# Patient Record
Sex: MALE | Race: WHITE
[De-identification: names, ages, dates, MRNs, and addresses within clinical notes are randomized per-mention and may not be internally consistent; named-entity substitution may affect disease eponyms.]

---

## 2017-06-20 NOTE — NUR
PT BIB PARAMEDICS, PT SENT FOR EVAL DUE TO C/O CP X 4 HRS, PT IS ALERT, 
ORIENTED X 3, NO RESP DISTRESS NOTED OR REPORTED UPON ASSESSMENT... MD AT 
BEDSIDE...

## 2017-06-21 NOTE — NUR
PATIENT SLEPT DURING THE REMAINDER OF MY SHIFT. NO CHANGES NOTED. TELE SR, SB AT THE UPPER 
50's.  SKIN INTACT. ADMISSION PROTOCOL FOLLOWED. ALL SAFETY AND COMFORT MEASURES MAINTAINED 
T/O SHIFT. CALL LIGHT WITHIN REACH.

## 2017-06-21 NOTE — NUR
Pt. admitted to Telemetry rm 212  , under care of Dr. Vogel, Belongs List 
completed, pt alert, oriented x 3, no resp distress noted or reported upon 
transfer assessment...pt transferred via gurney...

## 2017-06-21 NOTE — NUR
RECEIVED PT. IN BED WITH EYES CLOSED, RESTING. NO S/S OF DISTRESS. PT ON CARDIAC MONITOR 
WITH SINUS BIJU RHYTHM.

## 2017-06-21 NOTE — NUR
RESTING IN BED COMFORTABLY, WATCHING TV. ALERT, ORIENTED. ABLE TO MAKE NEEDS KNOWN. NO ACUTE 
DISTRESS NOTED. CALL LIGHT WITHIN REACH. WILL CONTINUE TO MONITOR

## 2017-06-22 NOTE — NUR
PT SEEN AND EVALUATED BY PHYSICAL THERAPY, UNABLE TO WALK FOR LONG DISTANCE DUE TO LOW BLOOD 
PRESSURE. PLEASE SEE PT NOTES.

## 2017-06-22 NOTE — NUR
RECEIVED PATIENT AWAKE IN BED, WATCHING TV. PATIENT IS A/O X4. PLEASANT WHEN APPROACHED. 
DENIES CHEST PAIN OR ANY OTHER DISCOMFORT AT THIS TIME. NO RESP. DISTRESS NOTED. VSS. CALL 
LIGHT IN REACH. BED ALARM ON. WILL CONTINUE TO MONITOR. ALL NEEDS ATTENDED.

## 2017-06-22 NOTE — NUR
PATIENT ASLEEP IN BED. NO RESP. DISTRESS NOTED. NO S/S OF PAIN OR DISCOMFORT. BED ALARM ON. 
CALL LIGHT IN REACH. ALL NEEDS ATTENDED. WILL CONTINUE TO MONITOR.

## 2017-06-22 NOTE — NUR
PATIENT REQUESTING AMBIEN 5MG PO PRN FOR SLEEP AND PERCOCET 1 TAB PO PRN FOR LOWER BACK 
PAIN. BED ALARM ON. VSS. CALL LIGHT IN REACH. ALL NEEDS ATTENDED. WILL CONTINUE TO MONITOR.

## 2017-06-23 NOTE — NUR
RECEIVED PATIENT AWAKE IN BED. A/O X4. FORGETFUL AT TIMES. DENIES PAIN OR DISCOMFORT. NO 
RESP. DISTRESS NOTED. VSS. H/L INTACT AND PATENT. BED ALARM ON. CALL LIGHT IN REACH. ALL 
NEEDS ATTENDED. WILL CONTINUE TO MONITOR.

## 2017-06-23 NOTE — NUR
PATIENT AWAKE IN BED. SLEPT WELL. DENIES PAIN OR DISCOMFORT. NO RESP. DISTRESS NOTED. BED 
ALARM ON. CALL LIGHT IN REACH. ALL NEEDS ATTENDED, WILL CONTINUE TO MONITOR.

## 2017-06-23 NOTE — NUR
PATIENT AWAKE IN BED, WATCHING TV. REQUESTING AMBIEN 5MG PO PRN FOR SLEEP AND PERCOCET 1 TAB 
PO PRN FOR LOWER BACK PAIN. BED ALARM ON. VSS. CALL LIGHT IN REACH. ALL NEEDS ATTENDED. WILL 
CONTINUE TO MONITOR.

## 2017-06-24 NOTE — NUR
Patient discharged from unit with EMTs. Report given to Sarita at [facility]. Patient 
understands discharge plan. Vitals stable. EMTs given report. Patient belongings returned. 
Photo of sacral area taken.

## 2017-06-24 NOTE — NUR
PATIENT ASLEEP IN BED. SLEPT WELL. DENIES PAIN. REDNESS NOTED TO BUTTOCKS, PICTURE TAKEN AND 
PLACED IN CHART. BED ALARM ON. CALL LIGHT IN REACH. ALL NEEDS ATTENDED. WILL CONTINUE TO 
MONITOR.

## 2017-06-24 NOTE — NUR
PATIENT ASLEEP. NO RESP. DISTRESS NOTED. BED ALARM ON. ALL NEEDS ATTENDED. WILL CONTINUE TO 
MONITOR.

## 2019-06-02 ENCOUNTER — HOSPITAL ENCOUNTER (INPATIENT)
Dept: HOSPITAL 12 - ER | Age: 84
LOS: 4 days | Discharge: NURSING, REHAB OR LONG-TERM CARE | DRG: 871 | End: 2019-06-06
Attending: INTERNAL MEDICINE | Admitting: INTERNAL MEDICINE
Payer: MEDICARE

## 2019-06-02 VITALS — SYSTOLIC BLOOD PRESSURE: 98 MMHG | DIASTOLIC BLOOD PRESSURE: 60 MMHG

## 2019-06-02 VITALS — HEIGHT: 74 IN | BODY MASS INDEX: 22.97 KG/M2 | WEIGHT: 179 LBS

## 2019-06-02 VITALS — DIASTOLIC BLOOD PRESSURE: 49 MMHG | SYSTOLIC BLOOD PRESSURE: 91 MMHG

## 2019-06-02 DIAGNOSIS — Z85.850: ICD-10-CM

## 2019-06-02 DIAGNOSIS — Z79.890: ICD-10-CM

## 2019-06-02 DIAGNOSIS — I50.32: ICD-10-CM

## 2019-06-02 DIAGNOSIS — D68.59: ICD-10-CM

## 2019-06-02 DIAGNOSIS — G62.9: ICD-10-CM

## 2019-06-02 DIAGNOSIS — Z85.810: ICD-10-CM

## 2019-06-02 DIAGNOSIS — F31.9: ICD-10-CM

## 2019-06-02 DIAGNOSIS — Z79.899: ICD-10-CM

## 2019-06-02 DIAGNOSIS — J44.0: ICD-10-CM

## 2019-06-02 DIAGNOSIS — M19.90: ICD-10-CM

## 2019-06-02 DIAGNOSIS — Z87.09: ICD-10-CM

## 2019-06-02 DIAGNOSIS — E44.0: ICD-10-CM

## 2019-06-02 DIAGNOSIS — J18.9: ICD-10-CM

## 2019-06-02 DIAGNOSIS — I11.0: ICD-10-CM

## 2019-06-02 DIAGNOSIS — M54.5: ICD-10-CM

## 2019-06-02 DIAGNOSIS — Z98.1: ICD-10-CM

## 2019-06-02 DIAGNOSIS — F17.290: ICD-10-CM

## 2019-06-02 DIAGNOSIS — N40.0: ICD-10-CM

## 2019-06-02 DIAGNOSIS — G20: ICD-10-CM

## 2019-06-02 DIAGNOSIS — D61.818: ICD-10-CM

## 2019-06-02 DIAGNOSIS — K21.9: ICD-10-CM

## 2019-06-02 DIAGNOSIS — A41.9: Primary | ICD-10-CM

## 2019-06-02 DIAGNOSIS — E87.6: ICD-10-CM
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
## 2019-06-02 DIAGNOSIS — J44.1: ICD-10-CM

## 2019-06-02 DIAGNOSIS — E89.0: ICD-10-CM

## 2019-06-02 DIAGNOSIS — G89.29: ICD-10-CM

## 2019-06-02 LAB
ALP SERPL-CCNC: 129 U/L (ref 50–136)
ALT SERPL W/O P-5'-P-CCNC: 14 U/L (ref 16–63)
APPEARANCE UR: CLEAR
AST SERPL-CCNC: 25 U/L (ref 15–37)
BASOPHILS # BLD AUTO: 0 K/UL (ref 0–8)
BASOPHILS NFR BLD AUTO: 0.4 % (ref 0–2)
BILIRUB DIRECT SERPL-MCNC: 0.2 MG/DL (ref 0–0.2)
BILIRUB SERPL-MCNC: 0.6 MG/DL (ref 0.2–1)
BILIRUB UR QL STRIP: NEGATIVE
BUN SERPL-MCNC: 19 MG/DL (ref 7–18)
CHLORIDE SERPL-SCNC: 104 MMOL/L (ref 98–107)
CO2 SERPL-SCNC: 31 MMOL/L (ref 21–32)
COLOR UR: YELLOW
CREAT SERPL-MCNC: 1.1 MG/DL (ref 0.6–1.3)
EOSINOPHIL # BLD AUTO: 0 K/UL (ref 0–0.7)
EOSINOPHIL NFR BLD AUTO: 0.1 % (ref 0–7)
GLUCOSE SERPL-MCNC: 124 MG/DL (ref 74–106)
GLUCOSE UR STRIP-MCNC: NEGATIVE MG/DL
HCT VFR BLD AUTO: 41.7 % (ref 36.7–47.1)
HGB BLD-MCNC: 14 G/DL (ref 12.5–16.3)
HGB UR QL STRIP: NEGATIVE
KETONES UR STRIP-MCNC: NEGATIVE MG/DL
LEUKOCYTE ESTERASE UR QL STRIP: NEGATIVE
LYMPHOCYTES # BLD AUTO: 0.6 K/UL (ref 20–40)
LYMPHOCYTES NFR BLD AUTO: 10.8 % (ref 20.5–51.5)
MCH RBC QN AUTO: 31 UUG (ref 23.8–33.4)
MCHC RBC AUTO-ENTMCNC: 34 G/DL (ref 32.5–36.3)
MCV RBC AUTO: 92.2 FL (ref 73–96.2)
MONOCYTES # BLD AUTO: 0.6 K/UL (ref 2–10)
MONOCYTES NFR BLD AUTO: 11 % (ref 0–11)
NEUTROPHILS # BLD AUTO: 4 K/UL (ref 1.8–8.9)
NEUTROPHILS NFR BLD AUTO: 77.7 % (ref 38.5–71.5)
NITRITE UR QL STRIP: NEGATIVE
PH UR STRIP: 5 [PH] (ref 5–8)
PLATELET # BLD AUTO: 126 K/UL (ref 152–348)
POTASSIUM SERPL-SCNC: 4.1 MMOL/L (ref 3.5–5.1)
RBC # BLD AUTO: 4.52 MIL/UL (ref 4.06–5.63)
SP GR UR STRIP: 1.01 (ref 1–1.03)
UROBILINOGEN UR STRIP-MCNC: 0.2 E.U./DL
WBC # BLD AUTO: 5.1 K/UL (ref 3.6–10.2)
WS STN SPEC: 7 G/DL (ref 6.4–8.2)

## 2019-06-02 PROCEDURE — A4663 DIALYSIS BLOOD PRESSURE CUFF: HCPCS

## 2019-06-02 PROCEDURE — G0378 HOSPITAL OBSERVATION PER HR: HCPCS

## 2019-06-02 RX ADMIN — CARBIDOPA AND LEVODOPA SCH TAB: 25; 250 TABLET ORAL at 17:22

## 2019-06-02 RX ADMIN — Medication SCH ML: at 21:31

## 2019-06-02 RX ADMIN — PREGABALIN SCH MG: 25 CAPSULE ORAL at 17:44

## 2019-06-02 RX ADMIN — Medication SCH MG: at 18:28

## 2019-06-02 RX ADMIN — TAMSULOSIN HYDROCHLORIDE SCH MG: 0.4 CAPSULE ORAL at 20:28

## 2019-06-02 RX ADMIN — GUAIFENESIN AND DEXTROMETHORPHAN PRN ML: 100; 10 SYRUP ORAL at 17:44

## 2019-06-02 RX ADMIN — SODIUM CHLORIDE PRN MLS/HR: 0.9 INJECTION, SOLUTION INTRAVENOUS at 17:44

## 2019-06-02 RX ADMIN — CARBIDOPA AND LEVODOPA SCH TAB: 25; 250 TABLET ORAL at 20:28

## 2019-06-02 RX ADMIN — TEMAZEPAM SCH MG: 7.5 CAPSULE ORAL at 20:29

## 2019-06-02 RX ADMIN — OXYCODONE HYDROCHLORIDE AND ACETAMINOPHEN SCH TAB: 5; 325 TABLET ORAL at 22:02

## 2019-06-02 RX ADMIN — ACETAMINOPHEN PRN MG: 325 TABLET ORAL at 17:55

## 2019-06-02 NOTE — NUR
RECEIVED PTA WAKE, ALERT AND ORIENTEDX3. PT SHOWS NO SIGNS OF ACUTE DISTRESS. IV 
INTACT.SAFETY AND COMFORT PROVIDED. WILL CONTINUE TO MONITOR.

## 2019-06-02 NOTE — NUR
88 year old male received from er via gurney to room 319 for pna.pt is axox4 orient the pt 
to room and surroundings,call light with in reach.

## 2019-06-03 VITALS — SYSTOLIC BLOOD PRESSURE: 93 MMHG | DIASTOLIC BLOOD PRESSURE: 47 MMHG

## 2019-06-03 VITALS — SYSTOLIC BLOOD PRESSURE: 94 MMHG | DIASTOLIC BLOOD PRESSURE: 59 MMHG

## 2019-06-03 VITALS — SYSTOLIC BLOOD PRESSURE: 90 MMHG | DIASTOLIC BLOOD PRESSURE: 47 MMHG

## 2019-06-03 VITALS — DIASTOLIC BLOOD PRESSURE: 47 MMHG | SYSTOLIC BLOOD PRESSURE: 91 MMHG

## 2019-06-03 VITALS — SYSTOLIC BLOOD PRESSURE: 115 MMHG | DIASTOLIC BLOOD PRESSURE: 55 MMHG

## 2019-06-03 LAB
ALP SERPL-CCNC: 113 U/L (ref 50–136)
ALT SERPL W/O P-5'-P-CCNC: 8 U/L (ref 16–63)
AST SERPL-CCNC: 21 U/L (ref 15–37)
BASOPHILS # BLD AUTO: 0 K/UL (ref 0–8)
BASOPHILS NFR BLD AUTO: 0.3 % (ref 0–2)
BILIRUB SERPL-MCNC: 0.6 MG/DL (ref 0.2–1)
BUN SERPL-MCNC: 17 MG/DL (ref 7–18)
CHLORIDE SERPL-SCNC: 103 MMOL/L (ref 98–107)
CHOLEST SERPL-MCNC: 108 MG/DL (ref ?–200)
CO2 SERPL-SCNC: 30 MMOL/L (ref 21–32)
CREAT SERPL-MCNC: 1.1 MG/DL (ref 0.6–1.3)
EOSINOPHIL # BLD AUTO: 0.1 K/UL (ref 0–0.7)
EOSINOPHIL NFR BLD AUTO: 1.6 % (ref 0–7)
GLUCOSE SERPL-MCNC: 94 MG/DL (ref 74–106)
HCT VFR BLD AUTO: 37 % (ref 36.7–47.1)
HDLC SERPL-MCNC: 32 MG/DL (ref 40–60)
HGB BLD-MCNC: 12.4 G/DL (ref 12.5–16.3)
IRON SERPL-MCNC: 31 UG/DL (ref 50–175)
LYMPHOCYTES # BLD AUTO: 0.6 K/UL (ref 20–40)
LYMPHOCYTES NFR BLD AUTO: 16.4 % (ref 20.5–51.5)
MAGNESIUM SERPL-MCNC: 2 MG/DL (ref 1.8–2.4)
MCH RBC QN AUTO: 30.9 UUG (ref 23.8–33.4)
MCHC RBC AUTO-ENTMCNC: 33 G/DL (ref 32.5–36.3)
MCV RBC AUTO: 92.6 FL (ref 73–96.2)
MONOCYTES # BLD AUTO: 0.5 K/UL (ref 2–10)
MONOCYTES NFR BLD AUTO: 14.3 % (ref 0–11)
NEUTROPHILS # BLD AUTO: 2.4 K/UL (ref 1.8–8.9)
NEUTROPHILS NFR BLD AUTO: 67.4 % (ref 38.5–71.5)
PHOSPHATE SERPL-MCNC: 2.9 MG/DL (ref 2.5–4.9)
PLATELET # BLD AUTO: 108 K/UL (ref 152–348)
POTASSIUM SERPL-SCNC: 3.8 MMOL/L (ref 3.5–5.1)
RBC # BLD AUTO: 4 MIL/UL (ref 4.06–5.63)
TRIGL SERPL-MCNC: 120 MG/DL (ref 30–150)
TSH SERPL DL<=0.005 MIU/L-ACNC: 0.42 MIU/ML (ref 0.36–3.74)
WBC # BLD AUTO: 3.5 K/UL (ref 3.6–10.2)
WS STN SPEC: 6 G/DL (ref 6.4–8.2)

## 2019-06-03 RX ADMIN — CARBIDOPA AND LEVODOPA SCH TAB: 25; 250 TABLET ORAL at 08:05

## 2019-06-03 RX ADMIN — PANTOPRAZOLE SODIUM SCH MG: 40 TABLET, DELAYED RELEASE ORAL at 06:01

## 2019-06-03 RX ADMIN — GUAIFENESIN AND DEXTROMETHORPHAN PRN ML: 100; 10 SYRUP ORAL at 00:29

## 2019-06-03 RX ADMIN — GUAIFENESIN AND DEXTROMETHORPHAN PRN ML: 100; 10 SYRUP ORAL at 21:18

## 2019-06-03 RX ADMIN — Medication SCH MCG: at 06:01

## 2019-06-03 RX ADMIN — VITAMIN D, TAB 1000IU (100/BT) SCH UNIT: 25 TAB at 08:06

## 2019-06-03 RX ADMIN — GUAIFENESIN AND DEXTROMETHORPHAN PRN ML: 100; 10 SYRUP ORAL at 16:09

## 2019-06-03 RX ADMIN — TAMSULOSIN HYDROCHLORIDE SCH MG: 0.4 CAPSULE ORAL at 21:18

## 2019-06-03 RX ADMIN — OXYCODONE HYDROCHLORIDE AND ACETAMINOPHEN SCH TAB: 5; 325 TABLET ORAL at 22:19

## 2019-06-03 RX ADMIN — LAMOTRIGINE SCH MG: 200 TABLET ORAL at 08:06

## 2019-06-03 RX ADMIN — TAZOBACTAM SODIUM AND PIPERACILLIN SODIUM SCH MLS/HR: 375; 3 INJECTION, SOLUTION INTRAVENOUS at 15:07

## 2019-06-03 RX ADMIN — POLYETHYLENE GLYCOL 3350 SCH GM: 17 POWDER, FOR SOLUTION ORAL at 08:06

## 2019-06-03 RX ADMIN — POTASSIUM CHLORIDE SCH MEQ: 1.5 POWDER, FOR SOLUTION ORAL at 08:05

## 2019-06-03 RX ADMIN — Medication SCH MG: at 16:09

## 2019-06-03 RX ADMIN — PREGABALIN SCH MG: 25 CAPSULE ORAL at 08:06

## 2019-06-03 RX ADMIN — CARBIDOPA AND LEVODOPA SCH TAB: 25; 250 TABLET ORAL at 21:18

## 2019-06-03 RX ADMIN — Medication SCH MG: at 08:06

## 2019-06-03 RX ADMIN — FINASTERIDE SCH MG: 5 TABLET, FILM COATED ORAL at 08:05

## 2019-06-03 RX ADMIN — GUAIFENESIN AND DEXTROMETHORPHAN PRN ML: 100; 10 SYRUP ORAL at 09:28

## 2019-06-03 RX ADMIN — PREGABALIN SCH MG: 25 CAPSULE ORAL at 16:03

## 2019-06-03 RX ADMIN — SODIUM CHLORIDE PRN MLS/HR: 0.9 INJECTION, SOLUTION INTRAVENOUS at 14:10

## 2019-06-03 RX ADMIN — TAZOBACTAM SODIUM AND PIPERACILLIN SODIUM SCH MLS/HR: 375; 3 INJECTION, SOLUTION INTRAVENOUS at 23:40

## 2019-06-03 RX ADMIN — DEXTROSE SCH MLS/HR: 50 INJECTION, SOLUTION INTRAVENOUS at 16:14

## 2019-06-03 RX ADMIN — CARBIDOPA AND LEVODOPA SCH TAB: 25; 250 TABLET ORAL at 12:02

## 2019-06-03 RX ADMIN — Medication SCH ML: at 21:18

## 2019-06-03 RX ADMIN — Medication SCH MG: at 08:05

## 2019-06-03 RX ADMIN — CARBIDOPA AND LEVODOPA SCH TAB: 25; 250 TABLET ORAL at 16:03

## 2019-06-03 RX ADMIN — TAZOBACTAM SODIUM AND PIPERACILLIN SODIUM SCH MLS/HR: 375; 3 INJECTION, SOLUTION INTRAVENOUS at 08:05

## 2019-06-03 RX ADMIN — TAZOBACTAM SODIUM AND PIPERACILLIN SODIUM SCH MLS/HR: 375; 3 INJECTION, SOLUTION INTRAVENOUS at 00:22

## 2019-06-03 RX ADMIN — FUROSEMIDE SCH MG: 40 TABLET ORAL at 08:06

## 2019-06-03 RX ADMIN — TEMAZEPAM SCH MG: 7.5 CAPSULE ORAL at 21:18

## 2019-06-03 NOTE — NUR
PT SLEPT THROUGHOUT THE SHIFT. PT SHOWS NO SIGNS OF ACUTE DISTRESS. IV INTACT. PRESCRIBED 
MEDICATION GIVEN AND PT TOLERATED IT WELL. SAFETY AND COMFORT PROVIDED. ALL NEEDS ARE MET. 
PT HAVE COUGH. GAVE COUGH MEDICATION. PT TOLERATED IT WELL. WILL ENDORSE ACCORDINGLY TO 
INCOMING NURSE FOR CONTINUITY OF CARE.

## 2019-06-03 NOTE — NUR
CLINICAL PHARMACY NOTE:VANCOMYCIN DOSING



S:

To start vancomycin dosing on 87 y/o male patient for pna



O:

Temp 97.9  BUN 17  Scr 1.1 WBC 3.5

ht 188 cm

wt 81.2 kg



Plan

patient received vanco 1gm IVPB x1 last night at 2300. Will start vanco 1250mg IVPB q19h for 
predicted vanco trough level of 16 mcg/ml at steady state. 1st dose today at 1700. Plan to 
order trough level prior to 4 th dose (not yet ordered) . Will monitor renal function & 
adjust the dose if needed. Will continue to monitor.

## 2019-06-03 NOTE — NUR
RECEIVED PT AWAKE, ALERT AND ORIENTEDX3.  PT SHOWS NO SIGNS OF ACUTE DISTRTESS. IV INTACT. 
SAFETY AND COMFORT PROVIDED. WILL CONTINUE TO MONITOR.

## 2019-06-03 NOTE — NUR
RECEIVED PT AWAKE, ALERT AND ORIENTEDX3.  PT SHOWS NO SIGNS OF ACUTE DISTRESS. IV INTACT. 
SAFETY AND COMFORT PROVIDED. ALANA INTACT. WILL CONTINUE TO MONITOR.

-------------------------------------------------------------------------------

Addendum: 06/04/19 at 0126 by PAUL KHOURY RN

-------------------------------------------------------------------------------

DIFFERENT PT.

## 2019-06-04 VITALS — SYSTOLIC BLOOD PRESSURE: 123 MMHG | DIASTOLIC BLOOD PRESSURE: 48 MMHG

## 2019-06-04 VITALS — DIASTOLIC BLOOD PRESSURE: 43 MMHG | SYSTOLIC BLOOD PRESSURE: 99 MMHG

## 2019-06-04 VITALS — DIASTOLIC BLOOD PRESSURE: 53 MMHG | SYSTOLIC BLOOD PRESSURE: 97 MMHG

## 2019-06-04 VITALS — SYSTOLIC BLOOD PRESSURE: 104 MMHG | DIASTOLIC BLOOD PRESSURE: 52 MMHG

## 2019-06-04 VITALS — SYSTOLIC BLOOD PRESSURE: 109 MMHG | DIASTOLIC BLOOD PRESSURE: 43 MMHG

## 2019-06-04 VITALS — DIASTOLIC BLOOD PRESSURE: 51 MMHG | SYSTOLIC BLOOD PRESSURE: 96 MMHG

## 2019-06-04 LAB
BASOPHILS # BLD AUTO: 0 K/UL (ref 0–8)
BASOPHILS NFR BLD AUTO: 0.3 % (ref 0–2)
BASOPHILS NFR BLD MANUAL: 1 % (ref 0–2)
BUN SERPL-MCNC: 14 MG/DL (ref 7–18)
CHLORIDE SERPL-SCNC: 105 MMOL/L (ref 98–107)
CO2 SERPL-SCNC: 28 MMOL/L (ref 21–32)
CREAT SERPL-MCNC: 0.8 MG/DL (ref 0.6–1.3)
EOSINOPHIL # BLD AUTO: 0.1 K/UL (ref 0–0.7)
EOSINOPHIL NFR BLD AUTO: 3.3 % (ref 0–7)
EOSINOPHIL NFR BLD MANUAL: 2 % (ref 0–8)
GLUCOSE SERPL-MCNC: 98 MG/DL (ref 74–106)
HCT VFR BLD AUTO: 35 % (ref 36.7–47.1)
HGB BLD-MCNC: 11.9 G/DL (ref 12.5–16.3)
LYMPHOCYTES # BLD AUTO: 0.6 K/UL (ref 20–40)
LYMPHOCYTES NFR BLD AUTO: 17.5 % (ref 20.5–51.5)
LYMPHOCYTES NFR BLD MANUAL: 16 % (ref 20–40)
MAGNESIUM SERPL-MCNC: 1.9 MG/DL (ref 1.8–2.4)
MCH RBC QN AUTO: 31.4 UUG (ref 23.8–33.4)
MCHC RBC AUTO-ENTMCNC: 34 G/DL (ref 32.5–36.3)
MCV RBC AUTO: 92.2 FL (ref 73–96.2)
MONOCYTES # BLD AUTO: 0.5 K/UL (ref 2–10)
MONOCYTES NFR BLD AUTO: 14.5 % (ref 0–11)
MONOCYTES NFR BLD MANUAL: 15 % (ref 2–10)
NEUTROPHILS # BLD AUTO: 2.1 K/UL (ref 1.8–8.9)
NEUTROPHILS NFR BLD AUTO: 64.4 % (ref 38.5–71.5)
NEUTS SEG NFR BLD MANUAL: 66 % (ref 42–75)
PHOSPHATE SERPL-MCNC: 2.4 MG/DL (ref 2.5–4.9)
PLATELET # BLD AUTO: 97 K/UL (ref 152–348)
POTASSIUM SERPL-SCNC: 3.2 MMOL/L (ref 3.5–5.1)
RBC # BLD AUTO: 3.8 MIL/UL (ref 4.06–5.63)
WBC # BLD AUTO: 3.2 K/UL (ref 3.6–10.2)

## 2019-06-04 RX ADMIN — ALBUTEROL SULFATE SCH MG: 1.25 SOLUTION RESPIRATORY (INHALATION) at 20:22

## 2019-06-04 RX ADMIN — Medication SCH MCG: at 06:12

## 2019-06-04 RX ADMIN — IPRATROPIUM BROMIDE SCH MG: 0.5 SOLUTION RESPIRATORY (INHALATION) at 20:22

## 2019-06-04 RX ADMIN — CARBIDOPA AND LEVODOPA SCH TAB: 25; 250 TABLET ORAL at 08:05

## 2019-06-04 RX ADMIN — FINASTERIDE SCH MG: 5 TABLET, FILM COATED ORAL at 08:05

## 2019-06-04 RX ADMIN — OXYCODONE HYDROCHLORIDE AND ACETAMINOPHEN SCH TAB: 5; 325 TABLET ORAL at 21:58

## 2019-06-04 RX ADMIN — TAZOBACTAM SODIUM AND PIPERACILLIN SODIUM SCH MLS/HR: 375; 3 INJECTION, SOLUTION INTRAVENOUS at 16:35

## 2019-06-04 RX ADMIN — GUAIFENESIN AND DEXTROMETHORPHAN PRN ML: 100; 10 SYRUP ORAL at 08:18

## 2019-06-04 RX ADMIN — POLYETHYLENE GLYCOL 3350 SCH GM: 17 POWDER, FOR SOLUTION ORAL at 08:06

## 2019-06-04 RX ADMIN — ALBUTEROL SULFATE SCH MG: 1.25 SOLUTION RESPIRATORY (INHALATION) at 14:12

## 2019-06-04 RX ADMIN — AZITHROMYCIN SCH MG: 250 TABLET, FILM COATED ORAL at 20:52

## 2019-06-04 RX ADMIN — TAMSULOSIN HYDROCHLORIDE SCH MG: 0.4 CAPSULE ORAL at 20:04

## 2019-06-04 RX ADMIN — Medication SCH MG: at 08:05

## 2019-06-04 RX ADMIN — CARBIDOPA AND LEVODOPA SCH TAB: 25; 250 TABLET ORAL at 20:04

## 2019-06-04 RX ADMIN — PANTOPRAZOLE SODIUM SCH MG: 40 TABLET, DELAYED RELEASE ORAL at 06:12

## 2019-06-04 RX ADMIN — TEMAZEPAM SCH MG: 7.5 CAPSULE ORAL at 21:57

## 2019-06-04 RX ADMIN — IPRATROPIUM BROMIDE SCH MG: 0.5 SOLUTION RESPIRATORY (INHALATION) at 14:12

## 2019-06-04 RX ADMIN — TAZOBACTAM SODIUM AND PIPERACILLIN SODIUM SCH MLS/HR: 375; 3 INJECTION, SOLUTION INTRAVENOUS at 08:19

## 2019-06-04 RX ADMIN — GUAIFENESIN AND DEXTROMETHORPHAN PRN ML: 100; 10 SYRUP ORAL at 02:31

## 2019-06-04 RX ADMIN — ACETAMINOPHEN PRN MG: 325 TABLET ORAL at 02:31

## 2019-06-04 RX ADMIN — PREGABALIN SCH MG: 25 CAPSULE ORAL at 16:16

## 2019-06-04 RX ADMIN — GUAIFENESIN AND DEXTROMETHORPHAN PRN ML: 100; 10 SYRUP ORAL at 22:01

## 2019-06-04 RX ADMIN — SODIUM CHLORIDE PRN MLS/HR: 0.9 INJECTION, SOLUTION INTRAVENOUS at 07:41

## 2019-06-04 RX ADMIN — Medication SCH DROP: at 20:05

## 2019-06-04 RX ADMIN — GUAIFENESIN AND DEXTROMETHORPHAN PRN ML: 100; 10 SYRUP ORAL at 16:55

## 2019-06-04 RX ADMIN — VITAMIN D, TAB 1000IU (100/BT) SCH UNIT: 25 TAB at 08:05

## 2019-06-04 RX ADMIN — FUROSEMIDE SCH MG: 40 TABLET ORAL at 08:05

## 2019-06-04 RX ADMIN — DEXTROSE SCH MLS/HR: 50 INJECTION, SOLUTION INTRAVENOUS at 11:17

## 2019-06-04 RX ADMIN — TAZOBACTAM SODIUM AND PIPERACILLIN SODIUM SCH MLS/HR: 375; 3 INJECTION, SOLUTION INTRAVENOUS at 23:03

## 2019-06-04 RX ADMIN — POTASSIUM CHLORIDE SCH MEQ: 1.5 POWDER, FOR SOLUTION ORAL at 08:05

## 2019-06-04 RX ADMIN — CARBIDOPA AND LEVODOPA SCH TAB: 25; 250 TABLET ORAL at 16:16

## 2019-06-04 RX ADMIN — Medication SCH MG: at 16:18

## 2019-06-04 RX ADMIN — PREGABALIN SCH MG: 25 CAPSULE ORAL at 08:05

## 2019-06-04 RX ADMIN — CARBIDOPA AND LEVODOPA SCH TAB: 25; 250 TABLET ORAL at 12:04

## 2019-06-04 RX ADMIN — LAMOTRIGINE SCH MG: 200 TABLET ORAL at 08:05

## 2019-06-04 NOTE — NUR
During first round, pt c/o persistent coughing, verbalized he is coughing so hard that it 
feels like he might break a rib. Per pt, PRN Robitussin is ineffective. Telephone call to 
Dr. Hart (oncall) and relayed pt's concern and request for a different cough medicine. Dr. Hart disagreed. Per MD, there nothing else we can do for the cough and that it is normal with 
PNA. No new orders at this time. Pt made aware.

## 2019-06-04 NOTE — NUR
PT SLEPT THROUGHOUT THE SHIFT. PT SHOWS NO SIGNS OF ACUTE DISTRESS. Pt cougHing gave prn 
robitussin. PRESCRIBED MEDICATION GIVEN AND PT TOLERATED IT WELL. SAFETY AND COMFORT 
PROVIDED. ALL NEEDS ARE MET. WILL ENDORSE ACCORDINGLY TO INCOMING NURSE FOR CONTINUITY OF 
CARE.

## 2019-06-04 NOTE — NUR
Rec'd pt sitting up in bed, AA&Ox4. Watching TV. Noted with cough, here for PNA. No ASE to 
antibiotic tx. Found with nasal cannula off, attempted to replace but patient prefers to 
have it off for now. Denies difficulty breathing, denies shortness of breath. SpO2 94% on 
RA. LFA 20g heplock in place and patent. C/o of persistent cough and the prn robitussin 
isn't helping. Will follow up with MD. All safety precautions in place at this time. Will 
cont to monitor.

## 2019-06-04 NOTE — NUR
CLINICAL PHARMACY NOTE:VANCOMYCIN DOSING



S:

To continue vancomycin dosing on 87 y/o male patient for pna



O:

Temp 98.1  BUN 14  Scr 0.8  WBC 3.2

ht 188 cm

wt 81.2 kg



Plan

Will continue same dose of vanco 1250mg IVPB q19h for today. 3rd dose tomorrow at 0700. Plan 
to order trough level prior to 4 th dose (not yet ordered) . Will monitor renal function & 
adjust the dose if needed. Will continue to monitor.

## 2019-06-05 VITALS — SYSTOLIC BLOOD PRESSURE: 108 MMHG | DIASTOLIC BLOOD PRESSURE: 51 MMHG

## 2019-06-05 VITALS — SYSTOLIC BLOOD PRESSURE: 105 MMHG | DIASTOLIC BLOOD PRESSURE: 50 MMHG

## 2019-06-05 VITALS — DIASTOLIC BLOOD PRESSURE: 50 MMHG | SYSTOLIC BLOOD PRESSURE: 100 MMHG

## 2019-06-05 VITALS — DIASTOLIC BLOOD PRESSURE: 50 MMHG | SYSTOLIC BLOOD PRESSURE: 103 MMHG

## 2019-06-05 LAB
BASOPHILS # BLD AUTO: 0 K/UL (ref 0–8)
BASOPHILS NFR BLD AUTO: 0.4 % (ref 0–2)
BUN SERPL-MCNC: 11 MG/DL (ref 7–18)
CHLORIDE SERPL-SCNC: 106 MMOL/L (ref 98–107)
CO2 SERPL-SCNC: 31 MMOL/L (ref 21–32)
CREAT SERPL-MCNC: 1.1 MG/DL (ref 0.6–1.3)
EOSINOPHIL # BLD AUTO: 0.1 K/UL (ref 0–0.7)
EOSINOPHIL NFR BLD AUTO: 3.7 % (ref 0–7)
GLUCOSE SERPL-MCNC: 97 MG/DL (ref 74–106)
HCT VFR BLD AUTO: 35.4 % (ref 36.7–47.1)
HGB BLD-MCNC: 11.9 G/DL (ref 12.5–16.3)
LYMPHOCYTES # BLD AUTO: 0.6 K/UL (ref 20–40)
LYMPHOCYTES NFR BLD AUTO: 16.9 % (ref 20.5–51.5)
MCH RBC QN AUTO: 30.9 UUG (ref 23.8–33.4)
MCHC RBC AUTO-ENTMCNC: 34 G/DL (ref 32.5–36.3)
MCV RBC AUTO: 92 FL (ref 73–96.2)
MONOCYTES # BLD AUTO: 0.4 K/UL (ref 2–10)
MONOCYTES NFR BLD AUTO: 12.3 % (ref 0–11)
NEUTROPHILS # BLD AUTO: 2.2 K/UL (ref 1.8–8.9)
NEUTROPHILS NFR BLD AUTO: 66.7 % (ref 38.5–71.5)
PHOSPHATE SERPL-MCNC: 2.7 MG/DL (ref 2.5–4.9)
PLATELET # BLD AUTO: 121 K/UL (ref 152–348)
POTASSIUM SERPL-SCNC: 3.9 MMOL/L (ref 3.5–5.1)
RBC # BLD AUTO: 3.85 MIL/UL (ref 4.06–5.63)
WBC # BLD AUTO: 3.3 K/UL (ref 3.6–10.2)

## 2019-06-05 RX ADMIN — AZITHROMYCIN SCH MG: 250 TABLET, FILM COATED ORAL at 19:48

## 2019-06-05 RX ADMIN — PANTOPRAZOLE SODIUM SCH MG: 40 TABLET, DELAYED RELEASE ORAL at 06:00

## 2019-06-05 RX ADMIN — FINASTERIDE SCH MG: 5 TABLET, FILM COATED ORAL at 08:33

## 2019-06-05 RX ADMIN — CARBIDOPA AND LEVODOPA SCH TAB: 25; 250 TABLET ORAL at 20:54

## 2019-06-05 RX ADMIN — GUAIFENESIN AND DEXTROMETHORPHAN PRN ML: 100; 10 SYRUP ORAL at 13:21

## 2019-06-05 RX ADMIN — IPRATROPIUM BROMIDE SCH MG: 0.5 SOLUTION RESPIRATORY (INHALATION) at 19:18

## 2019-06-05 RX ADMIN — TAMSULOSIN HYDROCHLORIDE SCH MG: 0.4 CAPSULE ORAL at 20:54

## 2019-06-05 RX ADMIN — Medication SCH MG: at 16:47

## 2019-06-05 RX ADMIN — ALBUTEROL SULFATE SCH MG: 1.25 SOLUTION RESPIRATORY (INHALATION) at 19:18

## 2019-06-05 RX ADMIN — GUAIFENESIN AND DEXTROMETHORPHAN PRN ML: 100; 10 SYRUP ORAL at 06:53

## 2019-06-05 RX ADMIN — LAMOTRIGINE SCH MG: 200 TABLET ORAL at 08:30

## 2019-06-05 RX ADMIN — CARBIDOPA AND LEVODOPA SCH TAB: 25; 250 TABLET ORAL at 13:23

## 2019-06-05 RX ADMIN — PREGABALIN SCH MG: 25 CAPSULE ORAL at 16:47

## 2019-06-05 RX ADMIN — Medication SCH MCG: at 06:00

## 2019-06-05 RX ADMIN — GUAIFENESIN AND DEXTROMETHORPHAN PRN ML: 100; 10 SYRUP ORAL at 18:41

## 2019-06-05 RX ADMIN — TAZOBACTAM SODIUM AND PIPERACILLIN SODIUM SCH MLS/HR: 375; 3 INJECTION, SOLUTION INTRAVENOUS at 15:55

## 2019-06-05 RX ADMIN — Medication SCH ML: at 20:54

## 2019-06-05 RX ADMIN — IPRATROPIUM BROMIDE SCH MG: 0.5 SOLUTION RESPIRATORY (INHALATION) at 13:15

## 2019-06-05 RX ADMIN — ALBUTEROL SULFATE SCH MG: 1.25 SOLUTION RESPIRATORY (INHALATION) at 13:15

## 2019-06-05 RX ADMIN — FUROSEMIDE SCH MG: 40 TABLET ORAL at 08:30

## 2019-06-05 RX ADMIN — VITAMIN D, TAB 1000IU (100/BT) SCH UNIT: 25 TAB at 08:32

## 2019-06-05 RX ADMIN — ALBUTEROL SULFATE SCH MG: 1.25 SOLUTION RESPIRATORY (INHALATION) at 07:35

## 2019-06-05 RX ADMIN — CARBIDOPA AND LEVODOPA SCH TAB: 25; 250 TABLET ORAL at 16:47

## 2019-06-05 RX ADMIN — POTASSIUM CHLORIDE SCH MEQ: 1.5 POWDER, FOR SOLUTION ORAL at 08:37

## 2019-06-05 RX ADMIN — TAZOBACTAM SODIUM AND PIPERACILLIN SODIUM SCH MLS/HR: 375; 3 INJECTION, SOLUTION INTRAVENOUS at 08:37

## 2019-06-05 RX ADMIN — Medication SCH MG: at 08:33

## 2019-06-05 RX ADMIN — OXYCODONE HYDROCHLORIDE AND ACETAMINOPHEN SCH TAB: 5; 325 TABLET ORAL at 22:16

## 2019-06-05 RX ADMIN — TEMAZEPAM SCH MG: 7.5 CAPSULE ORAL at 20:54

## 2019-06-05 RX ADMIN — PREGABALIN SCH MG: 25 CAPSULE ORAL at 08:34

## 2019-06-05 RX ADMIN — IPRATROPIUM BROMIDE SCH MG: 0.5 SOLUTION RESPIRATORY (INHALATION) at 19:16

## 2019-06-05 RX ADMIN — POLYETHYLENE GLYCOL 3350 SCH GM: 17 POWDER, FOR SOLUTION ORAL at 08:40

## 2019-06-05 RX ADMIN — IPRATROPIUM BROMIDE SCH MG: 0.5 SOLUTION RESPIRATORY (INHALATION) at 07:35

## 2019-06-05 RX ADMIN — GUAIFENESIN AND DEXTROMETHORPHAN PRN ML: 100; 10 SYRUP ORAL at 02:26

## 2019-06-05 RX ADMIN — ALBUTEROL SULFATE SCH MG: 1.25 SOLUTION RESPIRATORY (INHALATION) at 19:16

## 2019-06-05 RX ADMIN — Medication SCH MG: at 08:35

## 2019-06-05 RX ADMIN — CARBIDOPA AND LEVODOPA SCH TAB: 25; 250 TABLET ORAL at 08:31

## 2019-06-05 NOTE — NUR
RECEIVED PT AWAKE, ALERT AND ORIENTEDX4. PT SHOWS NO SIGNS OF ACUTE DISTRESS. IV INTACT. 
CALL LIGHT WITHIN REACH. SAFETY AND COMFORT PROVIDED. WILL CONTINUE TO MONITOR.

## 2019-06-05 NOTE — NUR
Patient AAOx4. Rested well in between care.  Tolerated routine medications.  IV on left 
forearm intact and patent.  Nonproductive cough noted.  PRN cough medicine administered as 
needed.  HOB elevated at all times.  Receiving 2L NC.  No acute distress noted at this time. 
 No SOB.  Comfort and safety provided at all times.  Will endorse to incoming shift. 
Continue plan of care.

## 2019-06-05 NOTE — NUR
patient refused 0700 and 1300 breathing treatments. explained benefits of treatment 
administration, patient still refused. patient says the treatments make him cough too much. 
nurse aware. vitals are with in normal limits

## 2019-06-06 VITALS — SYSTOLIC BLOOD PRESSURE: 100 MMHG | DIASTOLIC BLOOD PRESSURE: 46 MMHG

## 2019-06-06 VITALS — DIASTOLIC BLOOD PRESSURE: 49 MMHG | SYSTOLIC BLOOD PRESSURE: 110 MMHG

## 2019-06-06 VITALS — SYSTOLIC BLOOD PRESSURE: 95 MMHG | DIASTOLIC BLOOD PRESSURE: 40 MMHG

## 2019-06-06 RX ADMIN — GUAIFENESIN AND DEXTROMETHORPHAN PRN ML: 100; 10 SYRUP ORAL at 06:06

## 2019-06-06 RX ADMIN — CARBIDOPA AND LEVODOPA SCH TAB: 25; 250 TABLET ORAL at 12:31

## 2019-06-06 RX ADMIN — GUAIFENESIN AND DEXTROMETHORPHAN PRN ML: 100; 10 SYRUP ORAL at 01:56

## 2019-06-06 RX ADMIN — PREGABALIN SCH MG: 25 CAPSULE ORAL at 16:15

## 2019-06-06 RX ADMIN — Medication SCH MG: at 09:12

## 2019-06-06 RX ADMIN — PREGABALIN SCH MG: 25 CAPSULE ORAL at 09:10

## 2019-06-06 RX ADMIN — IPRATROPIUM BROMIDE SCH MG: 0.5 SOLUTION RESPIRATORY (INHALATION) at 07:35

## 2019-06-06 RX ADMIN — CARBIDOPA AND LEVODOPA SCH TAB: 25; 250 TABLET ORAL at 09:11

## 2019-06-06 RX ADMIN — ALBUTEROL SULFATE SCH MG: 1.25 SOLUTION RESPIRATORY (INHALATION) at 07:35

## 2019-06-06 RX ADMIN — Medication SCH MG: at 09:14

## 2019-06-06 RX ADMIN — FUROSEMIDE SCH MG: 40 TABLET ORAL at 09:12

## 2019-06-06 RX ADMIN — GUAIFENESIN AND DEXTROMETHORPHAN PRN ML: 100; 10 SYRUP ORAL at 10:24

## 2019-06-06 RX ADMIN — IPRATROPIUM BROMIDE SCH MG: 0.5 SOLUTION RESPIRATORY (INHALATION) at 13:30

## 2019-06-06 RX ADMIN — GUAIFENESIN AND DEXTROMETHORPHAN PRN ML: 100; 10 SYRUP ORAL at 16:20

## 2019-06-06 RX ADMIN — ALBUTEROL SULFATE SCH MG: 1.25 SOLUTION RESPIRATORY (INHALATION) at 13:30

## 2019-06-06 RX ADMIN — Medication SCH MCG: at 06:06

## 2019-06-06 RX ADMIN — FINASTERIDE SCH MG: 5 TABLET, FILM COATED ORAL at 09:10

## 2019-06-06 RX ADMIN — POTASSIUM CHLORIDE SCH MEQ: 1.5 POWDER, FOR SOLUTION ORAL at 09:12

## 2019-06-06 RX ADMIN — VITAMIN D, TAB 1000IU (100/BT) SCH UNIT: 25 TAB at 09:10

## 2019-06-06 RX ADMIN — LAMOTRIGINE SCH MG: 200 TABLET ORAL at 09:12

## 2019-06-06 RX ADMIN — CARBIDOPA AND LEVODOPA SCH TAB: 25; 250 TABLET ORAL at 16:15

## 2019-06-06 RX ADMIN — POLYETHYLENE GLYCOL 3350 SCH GM: 17 POWDER, FOR SOLUTION ORAL at 09:00

## 2019-06-06 RX ADMIN — Medication SCH MG: at 16:37

## 2019-06-06 RX ADMIN — TAZOBACTAM SODIUM AND PIPERACILLIN SODIUM SCH MLS/HR: 375; 3 INJECTION, SOLUTION INTRAVENOUS at 00:08

## 2019-06-06 RX ADMIN — PANTOPRAZOLE SODIUM SCH MG: 40 TABLET, DELAYED RELEASE ORAL at 06:06

## 2019-06-06 NOTE — NUR
PT SLEPT THROUGHOUT THE SHIFT. PT SHOWS NO SIGNS OF ACUTE DISTRESS. PT IV INTACT. SAFETY AND 
COMFORT PROVIDED. PRESCRIBED MEDICATION GIVEN AND PT TOLERATED IT WELL. PT HAVE COUGH AND 
GIVEN ROBITUSSIN 10ML AND 5ML. PT CONDITION IMPROVED. STILL NEED SPTUTUM CULTURE. PT KNOWS 
BUT CAN'T EXPECTORATE. ALL NEEDS ARE MET. WILL ENDORSE ACCORDINGLY TO INCOMING NURSE FOR 
CONTINUITY OF CARE.